# Patient Record
Sex: MALE | Race: WHITE | NOT HISPANIC OR LATINO | URBAN - METROPOLITAN AREA
[De-identification: names, ages, dates, MRNs, and addresses within clinical notes are randomized per-mention and may not be internally consistent; named-entity substitution may affect disease eponyms.]

---

## 2024-02-02 ENCOUNTER — EMERGENCY (EMERGENCY)
Facility: HOSPITAL | Age: 44
LOS: 1 days | Discharge: HOME | End: 2024-02-02
Attending: EMERGENCY MEDICINE | Admitting: EMERGENCY MEDICINE
Payer: COMMERCIAL

## 2024-02-02 VITALS
OXYGEN SATURATION: 98 % | TEMPERATURE: 98 F | RESPIRATION RATE: 18 BRPM | WEIGHT: 139.99 LBS | HEART RATE: 88 BPM | HEIGHT: 75 IN | SYSTOLIC BLOOD PRESSURE: 117 MMHG | DIASTOLIC BLOOD PRESSURE: 82 MMHG

## 2024-02-02 DIAGNOSIS — S50.11XA CONTUSION OF RIGHT FOREARM, INITIAL ENCOUNTER: ICD-10-CM
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
## 2024-02-02 DIAGNOSIS — S50.12XA CONTUSION OF LEFT FOREARM, INITIAL ENCOUNTER: ICD-10-CM

## 2024-02-02 PROCEDURE — 99282 EMERGENCY DEPT VISIT SF MDM: CPT

## 2024-02-02 PROCEDURE — 99284 EMERGENCY DEPT VISIT MOD MDM: CPT

## 2024-02-02 NOTE — ED PROVIDER NOTE - NSFOLLOWUPINSTRUCTIONS_ED_ALL_ED_FT
Follow up with your primary care provider regarding today's visit.  Return to the emergency department for any new, worsening or persisting symptoms.

## 2024-02-02 NOTE — ED PROVIDER NOTE - OBJECTIVE STATEMENT
42 yo M here c/o worsening ecchymosis to R forearm x 2 weeks. States initially noticed a small "band" of bruising to volar side wrist which has gradually become larger and moved to other side of forearm. Endorsing mild discomfort at site however denying significant pain. Endorsing 1 other smaller bruise to L forearm, no other bruising. Endorsing testing positive for the flu around the time of onset, unsure which occurred first, states flu Sx have mostly resolved.    Denies fevers/chills, numbness, tingling, weakness, injury/fall, change in skin sensation/color, numbness, tingling, weakness. 42 yo M here c/o ecchymosis to R forearm x 2 weeks. States initially noticed a small "band" of bruising to volar side wrist which gradually became larger and moved to other side of forearm. Endorsing mild discomfort at site however denying significant pain. Endorsing 1 other smaller bruise to L forearm, no other bruising. Endorsing testing positive for the flu around the time of onset, unsure which occurred first, states flu Sx have mostly resolved.    Denies fevers/chills, numbness, tingling, weakness, injury/fall, change in skin sensation/color, numbness, tingling, weakness. 44 yo M here c/o ecchymosis to R forearm x 2 weeks. States initially noticed a small "band" of bruising to volar side wrist which gradually became larger and "moved" to other side of forearm. Endorsing mild discomfort at site however denying significant pain. Endorsing 1 other smaller bruise to L forearm, no other bruising. Endorsing testing positive for the flu around the time of onset, unsure which occurred first, states flu Sx have mostly resolved. Also endorsing frequent ETOH describing multiple drinks/week.    Denies fevers/chills, numbness, tingling, weakness, known injury/fall, change in skin sensation/color, hx frequent bleeding/bruising, known bleeding disorder.

## 2024-02-02 NOTE — ED ADULT NURSE NOTE - OBJECTIVE STATEMENT
pt received into [facility] ambulatory appears comfortable arrives via walk in triage for eval of right wrist/ for arm bruise x 10 days no trauma injury denies pain at this time reprots intermittently hurts and the area of discoloration has been progressively growing up the arm. Denies numbness tingling. + radial pulse noted full ROM denies PMH daily meds. Went to [facility] last night who attempted to coordinate referral for vasc surgeon but were not able to so pt came to ED today for eval. Otherwise no cp sob palpitations N/V/D constipation fever chills. respirations unlabored abd nondistended no LE swelling

## 2024-02-02 NOTE — ED PROVIDER NOTE - CLINICAL SUMMARY MEDICAL DECISION MAKING FREE TEXT BOX
44 yo M here c/o ecchymosis to R forearm x 2 weeks.  VSS, afebrile, well-appearing.  NVI.    Pt denying pain, no bony TTP, clinically not suspicious for fx.  Not clinically suspicious for DVT or arterial thrombosis/ischemia.  Pt denying other hx bleeding/bruising, low suspicion for bleeding disorder.  More likely 2/2 mild trauma, now resolving.  No emergent intervention indicated at this time.    Provided home care instructions and return precautions.  Encouraged PCP F/U.

## 2024-02-02 NOTE — ED PROVIDER NOTE - PHYSICAL EXAMINATION
MSK:  RUE:  6x4 cm resolving localized ecchymosis noted to R distal forearm. No bleeding, drainage, erythema, streaking, warmth, fluctuance, break in skin, lesions, petechiae/purpura, or swelling.  Skin pink, warm and dry.  No bony TTP.  Painless FROM elbow, wrist and digits throughout.  Sensation intact throughout.  Cap refill <2 sec.  5/5 strength throughout.  Radial and ulnar pulses present.  Able to make "OK" sign, "thumbs up" sign, cross 2nd and 3rd fingers, and thumb opposition to small finger.    LUE:  3x2 cm faint resolving ecchymosis noted to L distal forearm.  No bleeding, drainage, erythema, streaking, warmth, fluctuance, break in skin, lesions, petechiae/purpura, or swelling.  Skin pink, warm and dry.  No bony TTP.

## 2024-02-02 NOTE — ED PROVIDER NOTE - ATTENDING APP SHARED VISIT CONTRIBUTION OF CARE
43M who denies PMHx who p/w ecchymosis to R forearm x 2 weeks. States initially noticed a small  bruising to side wrist which gradually became larger and moved to other side of forearm, only mild discomfort, no bony pain or deformity, no n/t/w. Pt denies trauma but does admit to drinking 4-5 drinks several days a week. Also reports recent flu- sx resolved.  On exam, VSS, right forearm NVI, no bony TTP, no deformity, compartments soft, resolving bruise noted to forearm. Do no suspect fracture thus no xray ordered. No indication for blood work at this time. Suspect pt bumped arm without realizing it. Explained that bruises can travel with gravity and change color as they resolve. Also advised limiting alcohol intake for improved jacqueline. Pt feeling improved and is stable for DC. ED evaluation and management discussed with the patient in detail.  Close PMD follow up encouraged.  Strict ED return instructions discussed in detail and patient given the opportunity to ask any questions about their discharge diagnosis and instructions. Patient verbalized understanding.

## 2024-02-02 NOTE — ED PROVIDER NOTE - PATIENT PORTAL LINK FT
You can access the FollowMyHealth Patient Portal offered by [facility] by registering at the following website: [email or web address]. By joining [employer]’s FollowMyHealth portal, you will also be able to view your health information using other applications (apps) compatible with our system.
